# Patient Record
(demographics unavailable — no encounter records)

---

## 2022-07-19 LAB
APPEARANCE: CLEAR
BILIRUBIN, URINE, POC: NEGATIVE
BLOOD URINE, POC: ABNORMAL
GLUCOSE URINE, POC: NEGATIVE MG/DL
KETONES, URINE, POC: 15 MG/DL
LEUKOCYTE EST, POC: NEGATIVE
NITRATE, URINE POC: NEGATIVE
PH, URINE, POC: 7.5 (ref 4.5–8)
PREGNANCY TEST URINE, POC: NEGATIVE
PROTEIN,URINE, POC: 100
SPECIFIC GRAVITY, URINE, POC: 1.02 (ref 1–1.03)
URINALYSIS COLOR, POC: YELLOW
UROBILIN U POC: 0.2 EU/DL

## 2022-07-27 PROBLEM — M54.12 BRACHIAL NEURITIS: Status: ACTIVE | Noted: 2022-07-27

## 2022-07-27 PROBLEM — D50.9 IRON DEFICIENCY ANEMIA: Status: ACTIVE | Noted: 2022-07-27

## 2022-07-27 PROBLEM — K64.9 HEMORRHOIDS: Status: ACTIVE | Noted: 2022-07-27

## 2022-07-27 PROBLEM — G56.01 CARPAL TUNNEL SYNDROME OF RIGHT WRIST: Status: ACTIVE | Noted: 2022-07-27

## 2022-07-27 PROBLEM — M50.20 DISPLACEMENT OF CERVICAL INTERVERTEBRAL DISC WITHOUT MYELOPATHY: Status: ACTIVE | Noted: 2022-07-27

## 2022-08-05 PROBLEM — M47.12 CERVICAL SPONDYLITIC CORD COMPRESSION: Status: ACTIVE | Noted: 2022-08-05

## 2022-08-05 PROBLEM — M47.816 LUMBAR SPONDYLOSIS: Status: ACTIVE | Noted: 2022-08-05

## 2022-10-16 NOTE — ED NOTES
Estimated Onset Date:   Unspecified ; Reactions:   Itching ; Created By:   Griffin Saleh RN, MEHUL BANSAL; Reaction Status:   Active ; Category:   Drug ; Substance:   codeine ; Type: Allergy ; Severity:   Unknown ; Updated By:   Roro Copper, [address]; Source:   Patient ; Reviewed Date:   7/18/2022 22:54 EDT      penicillin  Estimated Onset Date:   Unspecified ; Reactions:   Itching ; Created By:   Griffin Saleh RN, REBECCA H; Reaction Status:   Active ; Category:   Drug ; Substance:   penicillin ; Type: Allergy ; Severity:   Unknown ; Updated By:   Roro Copper, [address]; Source:   Patient ; Reviewed Date:   7/18/2022 22:54 EDT      Ultram  Estimated Onset Date:   Unspecified ; Reactions:   Nausea & vomiting ; Created By:   Daphne Molina RN, TIAN HARRIS; Reaction Status:   Active ; Category:   Drug ; Substance:   Ultram ; Type:   Side Effect ; Severity:   Severe ; Updated By:   Vivienne Kauffman RN, Wagner Arenas; Reviewed Date:   7/18/2022 22:54 EDT        Psycho-Social   Last 3 mo, thoughts killing self/others :   Patient denies   Right click within box for Suspected Abuse policy link. :   None   Feels Safe Where Live :   Yes   ED Behavioral Activity Rating Scale :   4 - Quiet and awake (normal level of activity)   Caden Brooks - 7/18/2022 22:53 EDT   ED Reason for Visit   (As Of: 7/18/2022 22:56:35 EDT)   Problems(Active)    Anemia (SNOMED CT  :831431054 )  Name of Problem:   Anemia ; Recorder:   KALLI BUSH; Confirmation:   Confirmed ; Classification:   Medical ; Code:   [id number] ; Contributor System:   Power[employer] ; Last Updated:   5/5/2022 12:59 EDT ; Life Cycle Date:   5/5/2022 ; Life Cycle Status:   Active ; Vocabulary:   SNOMED CT        Carpal tunnel (SNOMED CT  :885705273 )  Name of Problem:   Carpal tunnel ; Recorder:   ELEANOR Cerrato, Talita Grace; Confirmation:   Confirmed ; Classification:   Patient Stated ; Code:   [id number] ; Contributor System:   PowerChart ; Last Updated:   1/23/2018 14:15 EST ;  Life Cycle Date: 1/23/2018 ; Life Cycle Status:   Active ; Vocabulary:   SNOMED CT   ; Comments:        1/23/2018 14:15 - Naeem Bal RN, Gail BANSAL  RIGHT CARPAL TUNNEL SYNDROME      Hypertension (SNOMED CT  :1078589448 )  Name of Problem:   Hypertension ; Recorder:   KALLI BUSH; Confirmation:   Confirmed ; Classification:   Medical ; Code:   [id number] ; Contributor System:   [employer] ; Last Updated:   5/5/2022 12:59 EDT ;  Life Cycle Date:   5/5/2022 ; Life Cycle Status:   Active ; Vocabulary:   SNOMED CT          Diagnoses(Active)    Back pain  Date:   7/18/2022 ; Diagnosis Type:   Reason For Visit ; Confirmation:   Complaint of ; Clinical Dx:   Back pain ; Classification:   Medical ; Clinical Service:   Emergency medicine ; Code:   PNED ; Probability:   0 ; Diagnosis Code:   [id number]      Vomiting  Date:   7/18/2022 ; Diagnosis Type:   Reason For Visit ; Confirmation:   Complaint of ; Clinical Dx:   Vomiting ; Classification:   Medical ; Clinical Service:   Emergency medicine ; Code:   PNED ; Probability:   0 ; Diagnosis Code:   [id number]

## 2022-10-16 NOTE — ED NOTES
ED Patient Summary       ;          [facility] [place]  [address], [place], [address] [place] [address]  [phone]  Discharge Instructions (Patient)  Dotty Rivas  :  1978                   MRN: [id number]                   FIN: [id number]  Reason For Visit: Vomiting; Back pain; BACK PAIN  Final Diagnosis: Back pain; Neck pain, acute     Visit Date: 2022 22:36:00  Address: [address] [place] [address] [address] [address]  Phone: [phone]     Emergency Department Providers:         Primary Physician:      Miley DEL CID [place] would like to thank you for allowing us to assist you with your healthcare needs. The following includes patient education materials and information regarding your injury/illness. Follow-up Instructions: You were seen today on an emergency basis. Please contact your primary care doctor for a follow up appointment. If you received a referral to a specialist doctor, it is important you follow-up as instructed. It is important that you call your follow-up doctor to schedule and confirm the location of your next appointment. Your doctor may practice at multiple locations. The office location of your follow-up appointment may be different to the one written on your discharge instructions. If you do not have a primary care doctor, please call [phone] for help in finding a [facility] Provider. For help in finding a specialist doctor, please call [phone]. If your condition gets worse before your follow-up with your primary care doctor or specialist, please return to the Emergency Department. Coronavirus 2019 (COVID-19) Reminders:     Patients age 15 - 24, with parental consent, and over age 25 can make an appointment for a COVID-19 vaccine. Patients can contact their [facility] doctors' offices to schedule an appointment to receive the COVID-19 vaccine.  Patients who do not have a [facility] physician can call [phone] to schedule vaccination appointments. Follow Up Appointments:  Primary Care Provider:     Name: Jessi SAINZ     Phone: [phone]                 With: Address: When:   Follow-up with your neurosurgeon tomorrow for reevaluation. Return to the emergency room for fever numbness tingling weakness in extremities or any                [address] SERVICES%>             Medications that have not changed  Other Medications  acetaminophen (acetaminophen 500 mg oral tablet) 1 Tabs Oral (given by mouth) every 6 hours. not to exceed 4000 mg/day. Last Dose:____________________  amLODIPine (amLODIPine 5 mg oral tablet) 1 Tabs Oral (given by mouth) every day. Last Dose:____________________  baclofen (baclofen 20 mg oral tablet) 1 Tabs Oral (given by mouth) 3 times a day as needed muscle pain. Last Dose:____________________  docusate (Dulcolax Stool Softener 100 mg oral capsule) 1 Capsules Oral (given by mouth) 2 times a day as needed constipation. Refills: 0. Last Dose:____________________  docusate-senna (docusate-senna 50 mg-8.6 mg oral tablet) 1 Tabs Oral (given by mouth) 2 times a day as needed constipation. Last Dose:____________________  HYDROcodone-acetaminophen (HYDROcodone-acetaminophen 5 mg-325 mg oral tablet) 1 Tabs Oral (given by mouth) every 6 hours as needed moderate pain (4-7). do not exceed 4000mg of acetaminophen per day. Last Dose:____________________      Allergy Info: Ultram; penicillin; codeine     Discharge Additional Information          Discharge Patient 07/19/22 0:46:00 EDT      Patient Education Materials:        Musculoskeletal Pain    Musculoskeletal pain refers to aches and pains in your bones, joints, muscles, and the tissues that surround them. This pain can occur in any part of the body. It can last for a short time (acute) or a long time (chronic).     A physical exam, lab tests, and imaging studies may be done to find the cause of your musculoskeletal pain. Follow these instructions at home:    Lifestyle     Try to control or lower your stress levels. Stress increases muscle tension and can worsen musculoskeletal pain. It is important to recognize when you are anxious or stressed and learn ways to manage it. This may include:  ? Meditation or yoga. ? Cognitive or behavioral therapy. ? Acupuncture or massage therapy. You may continue all activities unless the activities cause more pain. When the pain gets better, slowly resume your normal activities. Gradually increase the intensity and duration of your activities or exercise. Managing pain, stiffness, and swelling         Treatment may include medicines for pain and inflammation that are taken by mouth or applied to the skin. Take over-the-counter and prescription medicines only as told by your health care provider. When your pain is severe, bed rest may be helpful. Lie or sit in any position that is comfortable, but get out of bed and walk around at least every couple of hours. If directed, apply heat to the affected area as often as told by your health care provider. Use the heat source that your health care provider recommends, such as a moist heat pack or a heating pad.  ? Place a towel between your skin and the heat source. ? Leave the heat on for 20?30 minutes. ? Remove the heat if your skin turns bright red. This is especially important if you are unable to feel pain, heat, or cold. You may have a greater risk of getting burned. If directed, put ice on the painful area. To do this:  ? Put ice in a plastic bag.    ? Place a towel between your skin and the bag.    ? Leave the ice on for 20 minutes, 2?3 times a day. ? Remove the ice if your skin turns bright red. This is very important. If you cannot feel pain, heat, or cold, you have a greater risk of damage to the area.         General instructions surgical center or outpatient lab. Test results are typically available 36 hours after the test is completed. [address] encourages you to self-enroll in the [facility] Patient Portal.     To begin your self-enrollment process, please visit [email or web address] Under [facility], click on Sign up now. NOTE: You must be 16 years and older to use [facility] Self-Enroll online. If you are a parent, caregiver, or guardian; you need an invite to access your childs or dependents health records. To obtain an invite, contact the Medical Records department at [phone] Monday through Friday, 8-4:30, select option 3 . If we receive your call afterhours, we will return your call the next business day. If you have issues trying to create or access your account, contact [employer] at [phone] available 7 days a week 24 hours a day.      Comment:

## 2022-10-16 NOTE — DISCHARGE SUMMARY
ED Clinical Summary                         [facility]  [address], [place], [phone]           PERSON INFORMATION  Name: Jacqui Moscoso Age:  37 Years : 1978   Sex: Female Language: English PCP: Dony SAINZ   Marital Status:  Single Phone: [phone] Med Service: MED-Medicine   MRN:  [id number] Acct# [de-identified] Arrival: 2022 22:36:00   Visit Reason: Vomiting; Back pain; BACK PAIN Acuity: 3 LOS: 000 02:32   Address:      [address] [place] [address]  Diagnosis:      Back pain; Neck pain, acute  Printed Prescriptions: Allergies      penicillin (Itching)      codeine (Itching)      Ultram (Nausea & vomiting)      Medications Administered During Visit:                  Medication Dose Route   ketorolac 30 mg IM   diazepam 5 mg Oral   ondansetron 4 mg Oral       Patient Medication List:              acetaminophen (acetaminophen 500 mg oral tablet) 1 Tabs Oral (given by mouth) every 6 hours. not to exceed 4000 mg/day. amLODIPine (amLODIPine 5 mg oral tablet) 1 Tabs Oral (given by mouth) every day. baclofen (baclofen 20 mg oral tablet) 1 Tabs Oral (given by mouth) 3 times a day as needed muscle pain. docusate (Dulcolax Stool Softener 100 mg oral capsule) 1 Capsules Oral (given by mouth) 2 times a day as needed constipation. Refills: 0.  docusate-senna (docusate-senna 50 mg-8.6 mg oral tablet) 1 Tabs Oral (given by mouth) 2 times a day as needed constipation. HYDROcodone-acetaminophen (HYDROcodone-acetaminophen 5 mg-325 mg oral tablet) 1 Tabs Oral (given by mouth) every 6 hours as needed moderate pain (4-7). do not exceed 4000mg of acetaminophen per day. Major Tests and Procedures: The following procedures and tests were performed during your ED visit. COMMONPROCEDURES%>  COMMON PROCEDURESCOMMENTS%>          Laboratory Orders  Name Status Details   . Preg U POC Completed Urine, RT, RT - Routine, Collected, 07/19/22 0:13:00 EDT, Nurse collect, 07/19/22 0:13:00 [facility], [facility] POC Login   . UA POC Completed Urine, RT, RT - Routine, Collected, 07/19/22 0:16:00 EDT, Nurse collect, 07/19/22 0:16:00 [facility], [facility] POC Login               Radiology Orders  No radiology orders were placed.               Patient Care Orders  Name Status Details   Discharge Patient Ordered 07/19/22 0:46:00 EDT   ED Assessment Adult Ordered 07/18/22 22:56:36 EDT, 07/18/22 22:56:36 EDT   ED Secondary Triage Completed 07/18/22 22:56:36 EDT, 07/18/22 22:56:36 EDT   ED Triage Adult Completed 07/18/22 22:36:18 EDT, 07/18/22 22:36:18 EDT   POC-Urine Dipstick collect Completed 07/18/22 23:06:00 EDT, Once, 07/18/22 23:06:00 EDT   POC-Urine Pregnancy Test collect Completed 07/18/22 23:06:00 EDT, Once, 07/18/22 23:06:00 EDT             PROVIDER INFORMATION               Provider Role Assigned Lali Cantor ED Provider 7/18/2022 23:05:55    Jaspreet Hatch ED Nurse 7/18/2022 23:12:53        Attending Physician:  Marisel GUERRA     Admit Doc  SCOUT DEL CID     Consulting Doc       VITALS INFORMATION  Vital Sign Triage Latest   Temp Oral ORAL_1%>37.2 degC ORAL%>37.2 degC   Temp Temporal TEMPORAL_1%> TEMPORAL%>   Temp Intravascular INTRAVASCULAR_1%> INTRAVASCULAR%>   Temp Axillary AXILLARY_1%> AXILLARY%>   Temp Rectal RECTAL_1%> RECTAL%>   02 Sat 100 % 100 %   Respiratory Rate RATE_1%>16 br/min RATE%>16 br/min   Peripheral Pulse Rate PULSE RATE_1%> PULSE RATE%>   Apical Heart Rate HEART RATE_1%> HEART RATE%>   Blood Pressure BLOOD PRESSURE_1%>/ BLOOD PRESSURE_1%>100 mmHg BLOOD PRESSURE%>130 mmHg / BLOOD PRESSURE%>100 mmHg                 Immunizations      No Immunizations Documented This Visit          DISCHARGE INFORMATION   Discharge Disposition: H Outpt-Sent Home   Discharge Location:    Home   Discharge Date and Time:    7/19/2022 01:08:15   ED Checkout Date and Time:    7/19/2022 01:08:15     DEPART REASON INCOMPLETE INFORMATION               Depart Action Incomplete Reason   Interactive View/I&O Recently assessed               Problems      Active           Anemia          Hypertension              Smoking Status      Never smoker         PATIENT EDUCATION INFORMATION  Instructions:       Musculoskeletal Pain     Follow up:                    With: Address: When:   Follow-up with your neurosurgeon tomorrow for reevaluation. Return to the emergency room for fever numbness tingling weakness in extremities or any             ED PROVIDER DOCUMENTATION     Patient:   Morgan Pelayo             MRN: [id number]            FIN: [id number]               Age:   37 years     Sex:  Female     :  1978   Associated Diagnoses:   Neck pain, acute; Back pain   Author:   Rajesh GUERRA      Basic Information   Time seen: Provider Seen (ST)   ED Provider/Time:    Rajesh GUERRA / 2022 23:05  . Additional information: Chief Complaint from Nursing Triage Note   Chief Complaint  Chief Complaint: pt presents to triage in [facility] c/o of left neck/shoulder pain and lower back pain. Had cervical discectomy in May. Also c/o of N/V (22 22:53:00). History of Present Illness   The patient presents with back pain and 42-year-old female history of cervical fusion a few months ago still going to physical therapy 3 times a week saw her surgeon on Friday because she is developing neck and back pain. Patient is scheduled for an MRI later on this month. Patient had recently had new meds called and baclofen and gabapentin which she just started today here because she took a hydrocodone throughout. Pain is worse with movement. She states when she went to physical therapy today they had a hard time completing her therapy because of discomfort. She has had no fevers or chills this is gradually worsening from the last several days and has already been evaluated to some degree by her neurosurgeon.   No numbness tingling or weakness in extremities. Pain is worse with movement better at rest.        Review of Systems   Musculoskeletal symptoms:  Back pain. Additional review of systems information: All other systems reviewed and otherwise negative. Health Status   Allergies: Allergic Reactions (Selected)  Unknown  Codeine- Itching. Penicillin- Itching. Nonallergic Reactions (Selected)  Severe  Ultram- Nausea & vomiting. .   Medications:  (Selected)   Inpatient Medications  Ordered  Toradol: 30 mg, 1 mL, IM, Once  Valium: 5 mg, 1 tabs, Oral, Once  Prescriptions  Prescribed  Dulcolax Stool Softener 100 mg oral capsule: 100 mg, 1 caps, Oral, BID, PRN: constipation, 20 caps, 0 Refill(s)  Documented Medications  Documented  HYDROcodone-acetaminophen 5 mg-325 mg oral tablet: 1 tabs, Oral, q6hr, do not exceed 4000mg of acetaminophen per day, PRN: moderate pain (4-7), 0 Refill(s)  acetaminophen 500 mg oral tablet: 500 mg, 1 tabs, Oral, q6hr, not to exceed 4000 mg/day, 0 Refill(s)  amLODIPine 5 mg oral tablet: 5 mg, 1 tabs, Oral, Daily, 0 Refill(s)  baclofen 20 mg oral tablet: 20 mg, 1 tabs, Oral, TID, PRN: muscle pain, 90 tabs, 0 Refill(s)  docusate-senna 50 mg-8.6 mg oral tablet: 1 tabs, Oral, BID, PRN: constipation, 0 Refill(s). Past Medical/ Family/ Social History   Medical history: Reviewed as documented in chart. Surgical history: Reviewed as documented in chart. Family history: Not significant. Social history: Reviewed as documented in chart. Problem list:    Active Problems (3)  Anemia   Carpal tunnel   Hypertension   . Physical Examination               Vital Signs   Vital Signs   4/42/4987 62:60 EDT Systolic Blood Pressure 696 mmHg    Diastolic Blood Pressure 866 mmHg  HI    Temperature Oral 37.2 degC    Heart Rate Monitored 76 bpm    Respiratory Rate 16 br/min    SpO2 100 %   .    Measurements   7/18/2022 22:56 EDT Body Mass Index est delvis 27.79 kg/m2    Body Mass Index Measured 27.79 kg/m2   7/18/2022 22:53 EDT Height/Length Measured 157 cm    Weight Dosing 68.5 kg   . Basic Oxygen Information   7/18/2022 22:53 EDT Oxygen Therapy Room air    SpO2 100 %   . General:  Alert, Appears mildly uncomfortable. Head:  Normocephalic. Neck:  Trachea midline, no JVD. Ears, nose, mouth and throat:  Oral mucosa moist.   Cardiovascular:  Regular rate and rhythm. Respiratory:  Lungs are clear to auscultation, respirations are non-labored. Back:  No redness or increased warmth noted she has paraspinous cervical and thoracic and lumbar discomfort to palpation. No midline masses or swelling appreciated. .   Neurological:  Alert and oriented to person, place, time, and situation, No focal neurological deficit observed. Psychiatric:  Cooperative, appropriate mood & affect. Medical Decision Making   Rationale:  Past medical and surgical history reviewed by me and also meds and allergies. Documents reviewed:  Emergency department nurses' notes, emergency department records. Reexamination/ Reevaluation   Time: 7/19/2022 00:00:00 . Vital signs   Basic Oxygen Information   7/18/2022 22:53 EDT Oxygen Therapy Room air    SpO2 100 %         Impression and Plan   Diagnosis   Neck pain, acute (FRB77-GR M54.2, Discharge, Medical)   Back pain (KIR68-GI M54.9, Discharge, Medical)   Plan   Condition: Stable. Patient was given the following educational materials: Musculoskeletal Pain. Follow up with: Follow-up with your neurosurgeon tomorrow for reevaluation. Return to the emergency room for fever numbness tingling weakness in extremities or any, Follow-up with your neurosurgeon tomorrow for reevaluation. Return to the emergency room for fever numbness tingling weakness in extremities or any. Counseled: Patient, Family.

## 2022-10-16 NOTE — ED NOTES
ED Triage Note       ED Secondary Triage Entered On:  7/18/2022 23:55 EDT    Performed On:  7/18/2022 23:54 EDT by Nya Donald               General Information   Barriers to Learning :   None evident   ED Home Meds Section :   Document assessment   [facility] ED Fall Risk Section :   Document assessment   ED History Section :   Document assessment   ED Advance Directives Section :   Document assessment   ED Palliative Screen :   N/A (prefilled for <64yo)   Nya Donlad - 7/18/2022 23:54 EDT   (As Of: 7/18/2022 23:55:22 EDT)   Problems(Active)    Anemia (SNOMED CT  :649352319 )  Name of Problem:   Anemia ; Recorder:   KALLI BUSH; Confirmation:   Confirmed ; Classification:   Medical ; Code:   [id number] ; Contributor System:   [employer] ; Last Updated:   5/5/2022 12:59 EDT ; Life Cycle Date:   5/5/2022 ; Life Cycle Status:   Active ; Vocabulary:   SNOMED CT        Carpal tunnel (SNOMED CT  :766881744 )  Name of Problem:   Carpal tunnel ; Recorder:   ELEANOR Cerrato, [facility]; Confirmation:   Confirmed ; Classification:   Patient Stated ; Code:   [id number] ; Contributor System:   PowerChart ; Last Updated:   1/23/2018 14:15 EST ; Life Cycle Date:   1/23/2018 ; Life Cycle Status:   Active ; Vocabulary:   SNOMED CT   ; Comments:        1/23/2018 14:15 - Mely Frederick RN, [place]   RIGHT CARPAL TUNNEL SYNDROME      Hypertension (SNOMED CT  :2060239705 )  Name of Problem:   Hypertension ; Recorder:   KALLI BUSH; Confirmation:   Confirmed ; Classification:   Medical ; Code:   [id number] ; Contributor System:   PowerChart ; Last Updated:   5/5/2022 12:59 EDT ;  Life Cycle Date:   5/5/2022 ; Life Cycle Status:   Active ; Vocabulary:   SNOMED CT          Diagnoses(Active)    Back pain  Date:   7/18/2022 ; Diagnosis Type:   Reason For Visit ; Confirmation:   Complaint of ; Clinical Dx:   Back pain ; Classification:   Medical ; Clinical Service:   Emergency medicine ; Code:   PNED ; Probability: 0 ; Diagnosis Code:   [id number]      Vomiting  Date:   7/18/2022 ; Diagnosis Type:   Reason For Visit ; Confirmation:   Complaint of ; Clinical Dx:   Vomiting ; Classification:   Medical ; Clinical Service:   Emergency medicine ; Code:   PNED ; Probability:   0 ; Diagnosis Code:   [id number]             -    Procedure History   (As Of: 7/18/2022 23:55:22 EDT)     Procedure Dt/Tm:   1/29/2018 12:48:00 EST ; Location:    OR ; Provider:   Eros Craig; Anesthesia Type:   Monitored Anesthesia Care ; :   Tere Asp; Anesthesia Minutes:   0 ; Procedure Name:   Wrist Carpal Tunnel Release or Neurolysis (Right) ; Procedure Minutes:   8 ; Comments:     1/29/2018 13:03 EST - Turner Kunz RN, Palmer Mcguire from documented surgical case ; Clinical Service:   Surgery            Procedure Dt/Tm:   2022 ;  Anesthesia Minutes:   0 ; Procedure Name:   hemorrhoidectomy ; Procedure Minutes:   0            Procedure Dt/Tm:   5/18/2022 08:45:00 EDT ; Location:    OR ; Provider:   Yuko Zuleta; Anesthesia Type:   General ; :   Karly HUNTER; Anesthesia Minutes:   0 ; Procedure Name:   Anterior Cervical Discectomy with Fusion and/or Stabilization SCIP ; Procedure Minutes:   97 ; Comments:     5/18/2022 10:39 EDT - Raegan Cristina RN, Dione Levin from documented surgical case ; Clinical Service:   Surgery            Anesthesia Minutes:   0 ; Procedure Name:   colonoscopy ; Procedure Minutes:   0            [facility] Fall Risk Assessment Tool   Hx of falling last 3 months ED Fall :   No   Patient confused or disoriented ED Fall :   No   Patient intoxicated or sedated ED Fall :   No   Patient impaired gait ED Fall :   No   Use a mobility assistance device ED Fall :   No   Patient altered elimination ED Fall :   No   [facility] ED Fall Score :   0    Ebb Yaraots - 7/18/2022 23:54 EDT   ED Advance Directive   Advance Directive :   No   Hugo Keto - 7/18/2022 23:54 EDT   Social History   Social History   (As Of: 7/18/2022 23:55:22 EDT)   Tobacco:        Never smoker   (Last Updated: 1/29/2018 11:56:52 EST by Michael Alcaraz, RN, BUCK)          Electronic Cigarette/Vaping:        Never Electronic Cigarette Use. (Last Updated: 5/9/2022 08:16:20 EDT by Mely Batres)          Alcohol:        Denies   (Last Updated: 1/29/2018 11:56:54 EST by Michael Alcaraz, ELEANOR, Laura Medrano)          Substance Use:        Opioid Naive - not currently taking opioids, Denies   (Last Updated: 5/9/2022 08:16:35 EDT by KALLI Murry)            Med Hx   Medication List   (As Of: 7/18/2022 23:55:22 EDT)   Normal Order    ondansetron 4 mg Dis Tab  :   ondansetron 4 mg Dis Tab ; Status:   Completed ; Ordered As Mnemonic:   Zofran ODT ; Simple Display Line:   4 mg, 1 tabs, Oral, Once ; Ordering Provider:   Nickolas GUERRA; Catalog Code:   ondansetron ; Order Dt/Tm:   7/18/2022 23:48:47 EDT          diazePAM 5 mg Tab  :   diazePAM 5 mg Tab ; Status:   Completed ; Ordered As Mnemonic:   Valium ; Simple Display Line:   5 mg, 1 tabs, Oral, Once ; Ordering Provider:   Nickolas GUERRA; Catalog Code:   diazepam ; Order Dt/Tm:   7/18/2022 23:38:25 EDT          ketorolac 30 mg/mL Inj Soln 1 mL  :   ketorolac 30 mg/mL Inj Soln 1 mL ; Status:   Completed ; Ordered As Mnemonic:   Toradol ; Simple Display Line:   30 mg, 1 mL, IM, Once ; Ordering Provider:   Nickolas GUERRA; Catalog Code:   ketorolac ; Order Dt/Tm:   7/18/2022 73:14:01 EDT            Prescription/Discharge Order    docusate  :   docusate ; Status:   Prescribed ; Ordered As Mnemonic:   Dulcolax Stool Softener 100 mg oral capsule ; Simple Display Line:   100 mg, 1 caps, Oral, BID, PRN: constipation, 20 caps, 0 Refill(s) ;  Ordering Provider:   Flonnie Kussmaul; Catalog Code:   docusate ; Order Dt/Tm:   5/27/2022 04:06:52 EDT            Home Meds    acetaminophen  : acetaminophen ; Status:   Documented ; Ordered As Mnemonic:   acetaminophen 500 mg oral tablet ; Simple Display Line:   500 mg, 1 tabs, Oral, q6hr, not to exceed 4000 mg/day, 0 Refill(s) ; Ordering Provider:   Samira Perez; Catalog Code:   acetaminophen ; Order Dt/Tm:   5/18/2022 15:36:37 EDT          docusate-senna  :   docusate-senna ; Status:   Documented ; Ordered As Mnemonic:   docusate-senna 50 mg-8.6 mg oral tablet ; Simple Display Line:   1 tabs, Oral, BID, PRN: constipation, 0 Refill(s) ; Ordering Provider:   Samira Perez; Catalog Code:   docusate-senna ; Order Dt/Tm:   5/18/2022 15:36:49 EDT          HYDROcodone-acetaminophen  :   HYDROcodone-acetaminophen ; Status:   Documented ; Ordered As Mnemonic:   HYDROcodone-acetaminophen 5 mg-325 mg oral tablet ; Simple Display Line:   1 tabs, Oral, q6hr, do not exceed 4000mg of acetaminophen per day, PRN: moderate pain (4-7), 0 Refill(s) ; Ordering Provider:   Samira Perez; Catalog Code:   HYDROcodone-acetaminophen ; Order Dt/Tm:   5/18/2022 15:36:58 EDT          baclofen  :   baclofen ; Status:   Documented ; Ordered As Mnemonic:   baclofen 20 mg oral tablet ; Simple Display Line:   20 mg, 1 tabs, Oral, TID, PRN: muscle pain, 90 tabs, 0 Refill(s) ; Catalog Code:   baclofen ; Order Dt/Tm:   5/9/2022 08:15:08 EDT          amLODIPine  :   amLODIPine ; Status:   Documented ; Ordered As Mnemonic:   amLODIPine 5 mg oral tablet ; Simple Display Line:   5 mg, 1 tabs, Oral, Daily, 0 Refill(s) ;  Catalog Code:   amLODIPine ; Order Dt/Tm:   5/5/2022 12:56:16 EDT

## 2022-10-16 NOTE — ED PROVIDER NOTES
Back pain        Patient:   Marii Del Rosario             MRN: [id number]            FIN: [id number]               Age:   37 years     Sex:  Female     :  1978   Associated Diagnoses:   Neck pain, acute; Back pain   Author:   Ewa GUERRA      Basic Information   Time seen: Provider Seen (ST)   ED Provider/Time:    Ewa GUERRA / 2022 23:05  . Additional information: Chief Complaint from Nursing Triage Note   Chief Complaint  Chief Complaint: pt presents to triage in [facility] c/o of left neck/shoulder pain and lower back pain. Had cervical discectomy in May. Also c/o of N/V (22 22:53:00). History of Present Illness   The patient presents with back pain and 75-year-old female history of cervical fusion a few months ago still going to physical therapy 3 times a week saw her surgeon on Friday because she is developing neck and back pain. Patient is scheduled for an MRI later on this month. Patient had recently had new meds called and baclofen and gabapentin which she just started today here because she took a hydrocodone throughout. Pain is worse with movement. She states when she went to physical therapy today they had a hard time completing her therapy because of discomfort. She has had no fevers or chills this is gradually worsening from the last several days and has already been evaluated to some degree by her neurosurgeon. No numbness tingling or weakness in extremities. Pain is worse with movement better at rest.        Review of Systems   Musculoskeletal symptoms:  Back pain. Additional review of systems information: All other systems reviewed and otherwise negative. Health Status   Allergies: Allergic Reactions (Selected)  Unknown  Codeine- Itching. Penicillin- Itching. Nonallergic Reactions (Selected)  Severe  Ultram- Nausea & vomiting. .   Medications:  (Selected)   Inpatient Medications  Ordered  Toradol: 30 mg, 1 mL, IM, Once  Valium: 5 mg, 1 tabs, Oral, Once  Prescriptions  Prescribed  Dulcolax Stool Softener 100 mg oral capsule: 100 mg, 1 caps, Oral, BID, PRN: constipation, 20 caps, 0 Refill(s)  Documented Medications  Documented  HYDROcodone-acetaminophen 5 mg-325 mg oral tablet: 1 tabs, Oral, q6hr, do not exceed 4000mg of acetaminophen per day, PRN: moderate pain (4-7), 0 Refill(s)  acetaminophen 500 mg oral tablet: 500 mg, 1 tabs, Oral, q6hr, not to exceed 4000 mg/day, 0 Refill(s)  amLODIPine 5 mg oral tablet: 5 mg, 1 tabs, Oral, Daily, 0 Refill(s)  baclofen 20 mg oral tablet: 20 mg, 1 tabs, Oral, TID, PRN: muscle pain, 90 tabs, 0 Refill(s)  docusate-senna 50 mg-8.6 mg oral tablet: 1 tabs, Oral, BID, PRN: constipation, 0 Refill(s). Past Medical/ Family/ Social History   Medical history: Reviewed as documented in chart. Surgical history: Reviewed as documented in chart. Family history: Not significant. Social history: Reviewed as documented in chart. Problem list:    Active Problems (3)  Anemia   Carpal tunnel   Hypertension   . Physical Examination               Vital Signs   Vital Signs   2/82/0724 19:37 EDT Systolic Blood Pressure 721 mmHg    Diastolic Blood Pressure 169 mmHg  HI    Temperature Oral 37.2 degC    Heart Rate Monitored 76 bpm    Respiratory Rate 16 br/min    SpO2 100 %   . Measurements   7/18/2022 22:56 EDT Body Mass Index est delvis 27.79 kg/m2    Body Mass Index Measured 27.79 kg/m2   7/18/2022 22:53 EDT Height/Length Measured 157 cm    Weight Dosing 68.5 kg   . Basic Oxygen Information   7/18/2022 22:53 EDT Oxygen Therapy Room air    SpO2 100 %   . General:  Alert, Appears mildly uncomfortable. Head:  Normocephalic. Neck:  Trachea midline, no JVD. Ears, nose, mouth and throat:  Oral mucosa moist.   Cardiovascular:  Regular rate and rhythm. Respiratory:  Lungs are clear to auscultation, respirations are non-labored.     Back:  No redness or increased warmth noted she has paraspinous cervical and thoracic and lumbar discomfort to palpation. No midline masses or swelling appreciated. .   Neurological:  Alert and oriented to person, place, time, and situation, No focal neurological deficit observed. Psychiatric:  Cooperative, appropriate mood & affect. Medical Decision Making   Rationale:  Past medical and surgical history reviewed by me and also meds and allergies. Documents reviewed:  Emergency department nurses' notes, emergency department records. Reexamination/ Reevaluation   Time: 7/19/2022 00:00:00 . Vital signs   Basic Oxygen Information   7/18/2022 22:53 EDT Oxygen Therapy Room air    SpO2 100 %         Impression and Plan   Diagnosis   Neck pain, acute (PTP92-RF M54.2, Discharge, Medical)   Back pain (MRB15-BA M54.9, Discharge, Medical)   Plan   Condition: Stable. Patient was given the following educational materials: Musculoskeletal Pain. Follow up with: Follow-up with your neurosurgeon tomorrow for reevaluation. Return to the emergency room for fever numbness tingling weakness in extremities or any, Follow-up with your neurosurgeon tomorrow for reevaluation. Return to the emergency room for fever numbness tingling weakness in extremities or any. Counseled: Patient, Family.     Signature Line     Electronically Signed on 07/19/2022 12:50 AM EDT   ________________________________________________   Preeti GUERRA               Modified by: Preeti GUERRA on 07/18/2022 11:41 PM EDT      Modified by: Pereti GUERRA on 07/19/2022 12:00 AM EDT      Modified by: Preeti GUERRA on 07/19/2022 12:00 AM EDT      Modified by: Preeti GUERRA on 07/19/2022 12:00 AM EDT

## 2022-10-16 NOTE — ED NOTES
ED Patient Education Note     Patient Education Materials Follows:  Orthopedics     Musculoskeletal Pain    Musculoskeletal pain refers to aches and pains in your bones, joints, muscles, and the tissues that surround them. This pain can occur in any part of the body. It can last for a short time (acute) or a long time (chronic). A physical exam, lab tests, and imaging studies may be done to find the cause of your musculoskeletal pain. Follow these instructions at home:    Lifestyle     Try to control or lower your stress levels. Stress increases muscle tension and can worsen musculoskeletal pain. It is important to recognize when you are anxious or stressed and learn ways to manage it. This may include:  ? Meditation or yoga. ? Cognitive or behavioral therapy. ? Acupuncture or massage therapy. You may continue all activities unless the activities cause more pain. When the pain gets better, slowly resume your normal activities. Gradually increase the intensity and duration of your activities or exercise. Managing pain, stiffness, and swelling         Treatment may include medicines for pain and inflammation that are taken by mouth or applied to the skin. Take over-the-counter and prescription medicines only as told by your health care provider. When your pain is severe, bed rest may be helpful. Lie or sit in any position that is comfortable, but get out of bed and walk around at least every couple of hours. If directed, apply heat to the affected area as often as told by your health care provider. Use the heat source that your health care provider recommends, such as a moist heat pack or a heating pad.  ? Place a towel between your skin and the heat source. ? Leave the heat on for 20?30 minutes. ? Remove the heat if your skin turns bright red. This is especially important if you are unable to feel pain, heat, or cold. You may have a greater risk of getting burned.        If directed, put ice on the painful area. To do this:  ? Put ice in a plastic bag.    ? Place a towel between your skin and the bag.    ? Leave the ice on for 20 minutes, 2?3 times a day. ? Remove the ice if your skin turns bright red. This is very important. If you cannot feel pain, heat, or cold, you have a greater risk of damage to the area. General instructions     Your health care provider may recommend that you see a physical therapist. This person can help you come up with a safe exercise program.     If told by your health care provider, do physical therapy exercises to improve movement and strength in the affected area. Keep all follow-up visits. This is important. This includes any physical therapy visits. Contact a health care provider if:     Your pain gets worse. Medicines do not help ease your pain. You cannot use the part of your body that hurts, such as your arm, leg, or neck. You have trouble sleeping. You have trouble doing your normal activities. Get help right away if:     You have a new injury and your pain is worse or different. You feel numb or you have tingling in the painful area. Summary     Musculoskeletal pain refers to aches and pains in your bones, joints, muscles, and the tissues that surround them. This pain can occur in any part of the body. Your health care provider may recommend that you see a physical therapist. This person can help you come up with a safe exercise program. Do any exercises as told by your physical therapist.     Gavin Sanders your stress level. Stress can worsen musculoskeletal pain. Ways to lower stress may include meditation, yoga, cognitive or behavioral therapy, acupuncture, and massage therapy. This information is not intended to replace advice given to you by your health care provider. Make sure you discuss any questions you have with your health care provider.       Document Revised: 04/22/2021 Document Reviewed: 03/31/2021  Elsevier Patient Education ?  [address] [place].